# Patient Record
Sex: FEMALE | Race: WHITE | NOT HISPANIC OR LATINO | Employment: FULL TIME | URBAN - METROPOLITAN AREA
[De-identification: names, ages, dates, MRNs, and addresses within clinical notes are randomized per-mention and may not be internally consistent; named-entity substitution may affect disease eponyms.]

---

## 2023-04-05 LAB — GROUP A STREP, PCR: NOT DETECTED

## 2023-12-07 ENCOUNTER — ANCILLARY PROCEDURE (OUTPATIENT)
Dept: RADIOLOGY | Facility: CLINIC | Age: 43
End: 2023-12-07
Payer: COMMERCIAL

## 2023-12-07 DIAGNOSIS — M79.672 PAIN IN LEFT FOOT: ICD-10-CM

## 2023-12-07 PROCEDURE — 73630 X-RAY EXAM OF FOOT: CPT | Mod: LEFT SIDE | Performed by: RADIOLOGY

## 2023-12-07 PROCEDURE — 73630 X-RAY EXAM OF FOOT: CPT | Mod: LT,FY

## 2024-11-06 ENCOUNTER — OFFICE VISIT (OUTPATIENT)
Dept: URGENT CARE | Age: 44
End: 2024-11-06
Payer: COMMERCIAL

## 2024-11-06 VITALS
RESPIRATION RATE: 16 BRPM | DIASTOLIC BLOOD PRESSURE: 88 MMHG | BODY MASS INDEX: 28.84 KG/M2 | SYSTOLIC BLOOD PRESSURE: 131 MMHG | HEART RATE: 76 BPM | WEIGHT: 168 LBS | OXYGEN SATURATION: 98 % | TEMPERATURE: 97.8 F

## 2024-11-06 DIAGNOSIS — J01.10 ACUTE FRONTAL SINUSITIS, RECURRENCE NOT SPECIFIED: Primary | ICD-10-CM

## 2024-11-06 PROCEDURE — 99213 OFFICE O/P EST LOW 20 MIN: CPT | Performed by: FAMILY MEDICINE

## 2024-11-06 RX ORDER — CEFDINIR 300 MG/1
300 CAPSULE ORAL 2 TIMES DAILY
Qty: 14 CAPSULE | Refills: 0 | Status: SHIPPED | OUTPATIENT
Start: 2024-11-06 | End: 2024-11-13

## 2024-11-06 NOTE — PROGRESS NOTES
Subjective   Patient ID: Shelbie Jimenez is a 44 y.o. female. They present today with a chief complaint of Cough, Sinus Problem, and Nasal Congestion.    Patient disposition: Home    History of Present Illness  HPI  Cough, congestion, sinus congestion for the past 1 week.  Has been taking Tylenol cold and sinus.  Several sick contacts at home.  No fever or chills.  No sore throat.  Daughter with strep throat this past week.  No seasonal allergies.  No history of asthma or bronchitis.      Past Medical History  Allergies as of 11/06/2024 - Reviewed 11/06/2024   Allergen Reaction Noted    Amoxicillin Rash 11/06/2024       (Not in a hospital admission)            reports that she has never smoked. She has never used smokeless tobacco.    Review of Systems  As noted in HPI. ROS otherwise negative unless noted.       Objective    Vitals:    11/06/24 1521   BP: 131/88   Pulse: 76   Resp: 16   Temp: 36.6 °C (97.8 °F)   SpO2: 98%   Weight: 76.2 kg (168 lb)     Patient's last menstrual period was 10/30/2024.    Physical Exam  Constitutional: vital signs reviewed. Well developed, well nourished. patient alert and patient without distress.   Head and Face: Normal and atraumatic.  Palpation of the face and sinuses: Frontal pressure  Ears, Nose, Mouth, and Throat:   Hearing: Normal.  External inspection of nose: Normal.   Lips, teeth, tongue and gums: Normal and well hydrated. External inspection of ears: Normal. Ear canals and TMs: Normal.  Posterior pharynx moist, no exudate, symmetric, no abscess, and with post nasal drip.  Nasal mucosa: Congested  Neck: No neck mass was observed. Supple. normal muscle tone.   Cardiovascular: Heart rate normal, normal S1 and S2, no gallops, no murmurs and no pericardial rub. Rhythm: Normal.  Pulmonary: No respiratory distress. Palpation of chest: Normal. Clear bilateral breath sounds.   Lymphatic: No cervical lymphadenopathy  Psych: Normal mood and affect        Procedures    Point of Care Test &  Imaging Results from this visit  Results for orders placed or performed in visit on 04/04/23   Group A Streptococcus, PCR    Collection Time: 04/04/23 10:08 AM    Specimen: Respiratory    Throat   Result Value Ref Range    Group A Strep PCR NOT DETECTED Not Detected            Diagnostic study results (if any) were reviewed.    Assessment/Plan   Allergies, medications, history, and pertinent labs/EKGs/Imaging reviewed.    Medical Decision Making  See note    Orders and Diagnoses  There are no diagnoses linked to this encounter.    Medical Admin Record      Follow Up Instructions  No follow-ups on file.      Electronically signed by [facility]